# Patient Record
Sex: MALE | Race: BLACK OR AFRICAN AMERICAN | URBAN - METROPOLITAN AREA
[De-identification: names, ages, dates, MRNs, and addresses within clinical notes are randomized per-mention and may not be internally consistent; named-entity substitution may affect disease eponyms.]

---

## 2017-05-22 ENCOUNTER — OUTPATIENT (OUTPATIENT)
Dept: OUTPATIENT SERVICES | Facility: HOSPITAL | Age: 47
LOS: 1 days | Discharge: HOME | End: 2017-05-22

## 2017-06-28 DIAGNOSIS — K02.53 DENTAL CARIES ON PIT AND FISSURE SURFACE PENETRATING INTO PULP: ICD-10-CM

## 2023-05-12 ENCOUNTER — OUTPATIENT (OUTPATIENT)
Dept: OUTPATIENT SERVICES | Facility: HOSPITAL | Age: 53
LOS: 1 days | End: 2023-05-12
Payer: SELF-PAY

## 2023-05-12 DIAGNOSIS — K02.63 DENTAL CARIES ON SMOOTH SURFACE PENETRATING INTO PULP: ICD-10-CM

## 2023-05-12 DIAGNOSIS — K02.9 DENTAL CARIES, UNSPECIFIED: ICD-10-CM

## 2023-05-12 PROCEDURE — D0330: CPT

## 2023-05-12 PROCEDURE — D0220: CPT

## 2023-05-12 PROCEDURE — D0140: CPT

## 2023-05-12 PROCEDURE — D7140: CPT

## 2024-02-13 ENCOUNTER — APPOINTMENT (OUTPATIENT)
Dept: NEUROLOGY | Facility: CLINIC | Age: 54
End: 2024-02-13

## 2024-02-13 PROBLEM — Z00.00 ENCOUNTER FOR PREVENTIVE HEALTH EXAMINATION: Status: ACTIVE | Noted: 2024-02-13

## 2024-02-22 ENCOUNTER — APPOINTMENT (OUTPATIENT)
Dept: NEUROLOGY | Facility: CLINIC | Age: 54
End: 2024-02-22
Payer: MEDICAID

## 2024-02-22 VITALS — HEIGHT: 70 IN | BODY MASS INDEX: 30.06 KG/M2 | WEIGHT: 210 LBS

## 2024-02-22 DIAGNOSIS — Z78.9 OTHER SPECIFIED HEALTH STATUS: ICD-10-CM

## 2024-02-22 DIAGNOSIS — Z86.59 PERSONAL HISTORY OF OTHER MENTAL AND BEHAVIORAL DISORDERS: ICD-10-CM

## 2024-02-22 DIAGNOSIS — G56.21 LESION OF ULNAR NERVE, RIGHT UPPER LIMB: ICD-10-CM

## 2024-02-22 DIAGNOSIS — G56.22 LESION OF ULNAR NERVE, LEFT UPPER LIMB: ICD-10-CM

## 2024-02-22 PROCEDURE — 99204 OFFICE O/P NEW MOD 45 MIN: CPT

## 2024-02-22 NOTE — ASSESSMENT
[FreeTextEntry1] : Cervical radiculopathy/bilateral carpal tunnel syndrome/bilateral cubital tunnel syndrome - MRI of the cervical spine without gadolinium - EMG/NCS of the upper extremities - A trial of physical therapy - Trial of gabapentin 300 mg 3 times daily.  Patient warned of the potential side effect of medication and he reported understanding   I, Lindsey Mathew, Attest that this documentation has been prepared under the direction and in the presence of Provider Sj Houston DO  Thank You for letting me assist in the management of this patient.   Sj Houston DO Board Certified, Neurology

## 2024-02-22 NOTE — PHYSICAL EXAM
[Person] : oriented to person [Place] : oriented to place [Time] : oriented to time [Visual Intact] : visual attention was ~T not ~L decreased [Concentration Intact] : normal concentrating ability [Naming Objects] : no difficulty naming common objects [Repeating Phrases] : no difficulty repeating a phrase [Writing A Sentence] : no difficulty writing a sentence [Fluency] : fluency intact [Comprehension] : comprehension intact [Reading] : reading intact [Past History] : adequate knowledge of personal past history [Cranial Nerves Optic (II)] : visual acuity intact bilaterally,  visual fields full to confrontation, pupils equal round and reactive to light [Cranial Nerves Oculomotor (III)] : extraocular motion intact [Cranial Nerves Trigeminal (V)] : facial sensation intact symmetrically [Cranial Nerves Facial (VII)] : face symmetrical [Cranial Nerves Vestibulocochlear (VIII)] : hearing was intact bilaterally [Cranial Nerves Glossopharyngeal (IX)] : tongue and palate midline [Cranial Nerves Accessory (XI - Cranial And Spinal)] : head turning and shoulder shrug symmetric [Cranial Nerves Hypoglossal (XII)] : there was no tongue deviation with protrusion [Motor Tone] : muscle tone was normal in all four extremities [Motor Strength] : muscle strength was normal in all four extremities [No Muscle Atrophy] : normal bulk in all four extremities [Abnormal Walk] : normal gait [Balance] : balance was intact [2+] : Ankle jerk left 2+ [Past-pointing] : there was no past-pointing [Tremor] : no tremor present [Plantar Reflex Right Only] : normal on the right [Plantar Reflex Left Only] : normal on the left [FreeTextEntry6] : Mild right  weakness 5 -/5 [FreeTextEntry7] : Positive Tinel's sign at bilateral wrist and elbows

## 2024-02-22 NOTE — HISTORY OF PRESENT ILLNESS
[FreeTextEntry1] : It is a pleasure to see MR. DELGADO In the office today. He is A 54-Year-Old Man who presents to the office today for the evaluation of Bilateral hand numbness that gradually progressed for one month. He has weakness on the right hand that radiates to both the fingertips to the elbow causing a numbing, tingling sensation. is left hand has mild tingling that does not radiate. He would sometimes get neck pain. He works on a [employer] and is currently on a leave of absence.

## 2024-02-23 ENCOUNTER — TRANSCRIPTION ENCOUNTER (OUTPATIENT)
Age: 54
End: 2024-02-23

## 2024-03-04 ENCOUNTER — APPOINTMENT (OUTPATIENT)
Dept: NEUROLOGY | Facility: CLINIC | Age: 54
End: 2024-03-04
Payer: MEDICAID

## 2024-03-04 PROCEDURE — 95912 NRV CNDJ TEST 11-12 STUDIES: CPT

## 2024-03-04 PROCEDURE — 95886 MUSC TEST DONE W/N TEST COMP: CPT

## 2024-03-12 ENCOUNTER — APPOINTMENT (OUTPATIENT)
Dept: MRI IMAGING | Facility: CLINIC | Age: 54
End: 2024-03-12

## 2024-03-25 ENCOUNTER — APPOINTMENT (OUTPATIENT)
Dept: NEUROLOGY | Facility: CLINIC | Age: 54
End: 2024-03-25

## 2024-04-09 ENCOUNTER — APPOINTMENT (OUTPATIENT)
Dept: NEUROLOGY | Facility: CLINIC | Age: 54
End: 2024-04-09
Payer: MEDICAID

## 2024-04-09 VITALS
HEART RATE: 60 BPM | DIASTOLIC BLOOD PRESSURE: 77 MMHG | SYSTOLIC BLOOD PRESSURE: 139 MMHG | HEIGHT: 70 IN | WEIGHT: 210 LBS | BODY MASS INDEX: 30.06 KG/M2

## 2024-04-09 PROCEDURE — 99214 OFFICE O/P EST MOD 30 MIN: CPT

## 2024-04-09 RX ORDER — GABAPENTIN 300 MG/1
300 CAPSULE ORAL 3 TIMES DAILY
Qty: 90 | Refills: 0 | Status: ACTIVE | COMMUNITY
Start: 2024-02-22 | End: 1900-01-01

## 2024-04-09 NOTE — ASSESSMENT
[FreeTextEntry1] : 54 year old male with cervical radiculopathy and right carpal tunnel syndrome.  Patient has been participating in physical therapy for his neck for the last 4-6 weeks and today reports no significant improvement therefore I have ordered an MRI of the cervical spine for further evaluation.  Regarding his carpal tunnel syndrome, today we reviewed his EMG testing which reflected his diagnosis.  Patient will begin occupational therapy for his bilateral wrists as well as a trial of Gabapentin 300mg once daily at night. Patient is currently out of work pending further testing and re-evaluation. Paper work for disability was completed today. He will return to the office for follow up in 6 weeks.   *All potential risks, benefits, side effects and interactions of any medications prescribed where discussed in detail with patient at the time of todays encounter.*  Supervising Physician : Sj Houston, DO

## 2024-04-09 NOTE — PHYSICAL EXAM
[Person] : oriented to person [Place] : oriented to place [Time] : oriented to time [Concentration Intact] : normal concentrating ability [Visual Intact] : visual attention was ~T not ~L decreased [Naming Objects] : no difficulty naming common objects [Repeating Phrases] : no difficulty repeating a phrase [Writing A Sentence] : no difficulty writing a sentence [Fluency] : fluency intact [Comprehension] : comprehension intact [Reading] : reading intact [Past History] : adequate knowledge of personal past history [Cranial Nerves Optic (II)] : visual acuity intact bilaterally,  visual fields full to confrontation, pupils equal round and reactive to light [Cranial Nerves Oculomotor (III)] : extraocular motion intact [Cranial Nerves Trigeminal (V)] : facial sensation intact symmetrically [Cranial Nerves Facial (VII)] : face symmetrical [Cranial Nerves Vestibulocochlear (VIII)] : hearing was intact bilaterally [Cranial Nerves Glossopharyngeal (IX)] : tongue and palate midline [Cranial Nerves Accessory (XI - Cranial And Spinal)] : head turning and shoulder shrug symmetric [Cranial Nerves Hypoglossal (XII)] : there was no tongue deviation with protrusion [Motor Tone] : muscle tone was normal in all four extremities [Motor Strength] : muscle strength was normal in all four extremities [No Muscle Atrophy] : normal bulk in all four extremities [Abnormal Walk] : normal gait [Balance] : balance was intact [2+] : Ankle jerk left 2+ [Motor Strength Upper Extremities Right] : there was weakness of the right upper extremity [PERRL With Normal Accommodation] : pupils were equal in size, round, reactive to light, with normal accommodation [Extraocular Movements] : extraocular movements were intact [Hearing Threshold Finger Rub Not [place]] : hearing was normal [Involuntary Movements] : no involuntary movements were seen [Past-pointing] : there was no past-pointing [Tremor] : no tremor present [Plantar Reflex Right Only] : normal on the right [Plantar Reflex Left Only] : normal on the left [FreeTextEntry6] : Mild right  weakness 5 -/5 [FreeTextEntry7] : Positive Tinel's sign at bilateral wrist and elbows [FreeTextEntry1] : midly limited ROM

## 2024-04-09 NOTE — HISTORY OF PRESENT ILLNESS
[FreeTextEntry1] : Original Presentation : It is a pleasure to see MR. STARK In the office today. He is A 54-Year-Old Man who presents to the office today for the evaluation of Bilateral hand numbness that gradually progressed for one month. He has weakness on the right hand that radiates to both the fingertips to the elbow causing a numbing, tingling sensation. is left hand has mild tingling that does not radiate. He would sometimes get neck pain. 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   Diagnostic Testing :  EMG Upper Extremities : Mild right Carpal tunnel syndrome   Today : Today I had the pleasure of seeing Mr. Stark   in our office for follow up.  Their previous history and physical findings have been reviewed.   Patient  remains under our neurologic care for Right Carpal tunnel syndrome and cervical radiculopathy. Patient has been participating in physical therapy for his neck for the last 4-6 weeks and today reports no significant improvement and endorses recurrent 7/10 primarily right sided neck pain that radiates down his right arm to his hand. Today we discussed getting an MRI of the cervical spine for further evaluation.  Regarding his carpal tunnel syndrome, patient reports constant numbness and tingling to his right wrist and fingers as well as hand  weakness. He was unable to begin OT for his hands or the trial of Gabapentin discussed with him by Dr. Houston at his prior visit, but is willing to begin today.

## 2024-04-09 NOTE — REVIEW OF SYSTEMS
[Negative] : Heme/Lymph [Hand Weakness] :  hand weakness [Numbness] : numbness [Tingling] : tingling

## 2024-04-09 NOTE — REASON FOR VISIT
[Consultation] : a consultation visit [Follow-Up: _____] : a [unfilled] follow-up visit [FreeTextEntry1] : Bilateral Hand numbness.

## 2024-04-10 ENCOUNTER — APPOINTMENT (OUTPATIENT)
Dept: NEUROLOGY | Facility: CLINIC | Age: 54
End: 2024-04-10

## 2024-05-28 ENCOUNTER — APPOINTMENT (OUTPATIENT)
Dept: NEUROLOGY | Facility: CLINIC | Age: 54
End: 2024-05-28
Payer: MEDICAID

## 2024-05-28 VITALS — BODY MASS INDEX: 30.06 KG/M2 | WEIGHT: 210 LBS | HEIGHT: 70 IN

## 2024-05-28 VITALS — DIASTOLIC BLOOD PRESSURE: 71 MMHG | SYSTOLIC BLOOD PRESSURE: 111 MMHG | HEART RATE: 71 BPM

## 2024-05-28 PROCEDURE — 99214 OFFICE O/P EST MOD 30 MIN: CPT

## 2024-05-28 NOTE — HISTORY OF PRESENT ILLNESS
[FreeTextEntry1] : Mr. CHARANJIT DELGADO returns to the office for follow-up and his prior history and physical have been reviewed and he reports no change since last visit.  he has been seeing us for symptoms suggestive of bilateral carpal tunnel syndrome as well as cervical radiculopathy.  He has had MRI of the cervical spine which showed multilevel degenerative changes most prominent at the level of C4-5 and C5-6 which caused mild stenosis as well as moderate to severe foraminal stenosis bilaterally.  EMG nerve conduction study of the upper extremities only showed mild right carpal tunnel syndrome.  Patient has done physical therapy but has not gotten better in terms of the pain shooting down his right arm, plus the weakness that he feels in the right arm has not improved.  He started doing occupational therapy but only for 2 sessions.  He denies any urinary/bowel incontinence

## 2024-05-28 NOTE — ASSESSMENT
[FreeTextEntry1] : Cervical radiculopathy-with associated weakness without improvement with physical therapy - Neurosurgical evaluation - Pain management for pain control  Right carpal tunnel syndrome - Mild, not likely to be the cause of his main complaint. - Can continue with occupational therapy  Disability paperwork filled out, not recommended to go back to work until next visit

## 2024-05-28 NOTE — PHYSICAL EXAM
[Person] : oriented to person [Place] : oriented to place [Time] : oriented to time [Concentration Intact] : normal concentrating ability [Visual Intact] : visual attention was ~T not ~L decreased [Naming Objects] : no difficulty naming common objects [Repeating Phrases] : no difficulty repeating a phrase [Writing A Sentence] : no difficulty writing a sentence [Fluency] : fluency intact [Comprehension] : comprehension intact [Reading] : reading intact [Past History] : adequate knowledge of personal past history [Cranial Nerves Optic (II)] : visual acuity intact bilaterally,  visual fields full to confrontation, pupils equal round and reactive to light [Cranial Nerves Oculomotor (III)] : extraocular motion intact [Cranial Nerves Trigeminal (V)] : facial sensation intact symmetrically [Cranial Nerves Facial (VII)] : face symmetrical [Cranial Nerves Vestibulocochlear (VIII)] : hearing was intact bilaterally [Cranial Nerves Glossopharyngeal (IX)] : tongue and palate midline [Cranial Nerves Accessory (XI - Cranial And Spinal)] : head turning and shoulder shrug symmetric [Cranial Nerves Hypoglossal (XII)] : there was no tongue deviation with protrusion [Motor Tone] : muscle tone was normal in all four extremities [Motor Strength] : muscle strength was normal in all four extremities [No Muscle Atrophy] : normal bulk in all four extremities [Abnormal Walk] : normal gait [Balance] : balance was intact [Past-pointing] : there was no past-pointing [Tremor] : no tremor present [2+] : Ankle jerk left 2+ [Plantar Reflex Right Only] : normal on the right [Plantar Reflex Left Only] : normal on the left [FreeTextEntry6] : Mild right  weakness 5 -/5 [FreeTextEntry7] : Positive Tinel's sign at bilateral wrist and elbows

## 2024-06-07 ENCOUNTER — NON-APPOINTMENT (OUTPATIENT)
Age: 54
End: 2024-06-07

## 2024-06-18 ENCOUNTER — APPOINTMENT (OUTPATIENT)
Dept: NEUROLOGY | Facility: CLINIC | Age: 54
End: 2024-06-18
Payer: MEDICAID

## 2024-06-18 VITALS — HEIGHT: 70 IN | BODY MASS INDEX: 30.06 KG/M2 | WEIGHT: 210 LBS

## 2024-06-18 DIAGNOSIS — T88.7XXA UNSPECIFIED ADVERSE EFFECT OF DRUG OR MEDICAMENT, INITIAL ENCOUNTER: ICD-10-CM

## 2024-06-18 DIAGNOSIS — G56.21 LESION OF ULNAR NERVE, RIGHT UPPER LIMB: ICD-10-CM

## 2024-06-18 PROCEDURE — 99214 OFFICE O/P EST MOD 30 MIN: CPT

## 2024-06-18 NOTE — HISTORY OF PRESENT ILLNESS
[FreeTextEntry1] : Mr. CHARANJIT DELGADO returns to the office for follow-up and his prior history and physical have been reviewed and he reports no change since last visit.  he has been seeing us for  cervical radiculopathy, right carpal tunnel syndrome.  He has had MRI of the cervical spine, EMG/NCS of the upper extremities done and has started on physical therapy as well as Occupational Therapy.  He has only done a few sessions without any improvement.  He still complains of baseline pain of 7-8/10 with limited range of motion in the neck and shooting pain both down the neck as well as up the arm from his wrist.    When he was seen last time less than 3 weeks ago, he was recommended to see pain management and neurosurgery for additional treatment and intervention which he has not done.  He has been on short-term disability which is expiring and he would like to apply for long-term disability  He takes gabapentin 300 mg twice a day but he does complain that it makes him drowsy even at this dosage.

## 2024-06-18 NOTE — ASSESSMENT
[FreeTextEntry1] : Cervical radiculopathy/right carpal tunnel syndrome - Workup completed - Continue with gabapentin - Pain management as ordered - Continue with neurosurgery as ordered - I told the patient that I cannot certify his long-term disability based on few sessions of physical therapy and Occupational Therapy.  He should see the pain specialist as well as neurosurgery, and he is agreeable with the plan

## 2024-06-24 ENCOUNTER — APPOINTMENT (OUTPATIENT)
Dept: PAIN MANAGEMENT | Facility: CLINIC | Age: 54
End: 2024-06-24
Payer: MEDICAID

## 2024-06-24 DIAGNOSIS — G56.03 CARPAL TUNNEL SYNDROM,BILATERAL UPPER LIMBS: ICD-10-CM

## 2024-06-24 DIAGNOSIS — M54.12 RADICULOPATHY, CERVICAL REGION: ICD-10-CM

## 2024-06-24 PROCEDURE — 99204 OFFICE O/P NEW MOD 45 MIN: CPT

## 2024-06-24 RX ORDER — MELOXICAM 15 MG/1
15 TABLET ORAL DAILY
Qty: 30 | Refills: 0 | Status: ACTIVE | COMMUNITY
Start: 2024-06-24 | End: 1900-01-01

## 2024-07-10 ENCOUNTER — APPOINTMENT (OUTPATIENT)
Dept: PAIN MANAGEMENT | Facility: CLINIC | Age: 54
End: 2024-07-10

## 2024-07-26 ENCOUNTER — APPOINTMENT (OUTPATIENT)
Dept: PAIN MANAGEMENT | Facility: CLINIC | Age: 54
End: 2024-07-26

## 2024-07-30 ENCOUNTER — APPOINTMENT (OUTPATIENT)
Dept: NEUROSURGERY | Facility: CLINIC | Age: 54
End: 2024-07-30
Payer: MEDICAID

## 2024-07-30 VITALS — BODY MASS INDEX: 30.06 KG/M2 | HEIGHT: 70 IN | WEIGHT: 210 LBS

## 2024-07-30 DIAGNOSIS — Z78.9 OTHER SPECIFIED HEALTH STATUS: ICD-10-CM

## 2024-07-30 DIAGNOSIS — F17.200 NICOTINE DEPENDENCE, UNSPECIFIED, UNCOMPLICATED: ICD-10-CM

## 2024-07-30 PROCEDURE — 99203 OFFICE O/P NEW LOW 30 MIN: CPT

## 2024-08-01 NOTE — ASSESSMENT
[FreeTextEntry1] : C4-5, C5-6 TDR candidate  Patient will return to the office should he wish to schedule

## 2024-08-01 NOTE — HISTORY OF PRESENT ILLNESS
[de-identified] : Mr Stark presents for evaluation of his cervical spondylosis with radiculopathy as seen on xray/mri of the cervical spine. His symptoms are bilateral and predominantly in C5/6 distribution. He has failed physical therapy and pain management to date. MRI demonstrates severe spondylotic changes at C4-5, C5-6. Given these findings, he is a candidate for C4-5, C5-6 TDR vs ACDF; he will return to our office when he is ready.
[de-identified] : Mr Stark presents for evaluation of his cervical spondylosis with radiculopathy as seen on xray/mri of the cervical spine. His symptoms are bilateral and predominantly in C5/6 distribution. He has failed physical therapy and pain management to date. MRI demonstrates severe spondylotic changes at C4-5, C5-6. Given these findings, he is a candidate for C4-5, C5-6 TDR vs ACDF; he will return to our office when he is ready.
No

## 2024-08-01 NOTE — PHYSICAL EXAM
[General Appearance - Alert] : alert [Oriented To Time, Place, And Person] : oriented to person, place, and time [Motor Tone] : muscle tone was normal in all four extremities [Motor Strength] : muscle strength was normal in all four extremities [Motor Handedness Right-Handed] : the patient is right hand dominant [Sensation Tactile Decrease] : light touch was intact [Sensation Pain / Temperature Decrease] : pain and temperature was intact [Abnormal Walk] : normal gait

## 2024-08-06 ENCOUNTER — APPOINTMENT (OUTPATIENT)
Dept: NEUROLOGY | Facility: CLINIC | Age: 54
End: 2024-08-06

## 2024-08-06 PROCEDURE — 99213 OFFICE O/P EST LOW 20 MIN: CPT

## 2024-08-06 NOTE — PHYSICAL EXAM

## 2024-08-06 NOTE — HISTORY OF PRESENT ILLNESS
[FreeTextEntry1] : Mr. CHARANJIT DELGADO returns to the office for follow-up and his prior history and physical have been reviewed and he reports no change since last visit.  he has been seeing us for  cervical radiculopathy, right carpal tunnel syndrome.  He has had MRI of the cervical spine, EMG/NCS of the upper extremities done and has started on physical therapy as well as Occupational Therapy.  He has only done a few sessions without any improvement.  He still complains of baseline pain of 7-8/10 with limited range of motion in the neck and shooting pain both down the neck as well as up the arm from his wrist.  When he was seen last time less than 3 weeks ago, he was recommended to see pain management and neurosurgery for additional treatment and intervention which he has not done.  He has been on short-term disability which is expiring and he would like to apply for long-term disability. He takes gabapentin 300 mg twice a day but he does complain that it makes him drowsy even at this dosage.  Today : Today I had the pleasure of seeing Mr. DELGADO in our office for follow up.  Their past medical history and imaging have been reviewed.    Patient  remains under our neurologic care for cervical radiculopathy and  right carpal tunnel syndrome.  He has had MRI of the cervical spine, EMG/NCS of the upper extremities done and has started on physical therapy as well as Occupational Therapy and did not see significant improvement. Since his last visit he has seen pain management who offered a NEREIDA C7-T1 NO MAC and was prescribed Meloxicam 15mg x 4 weeks. On 7.30.24 he saw Neurosurgeon Dr. Benitez who felt he may be a candidate for a C4-5, C5-6 TDR vs ACDF and was advised to follow up if he wishes to proceed. Today patients primary concern is that his short term disability has run out and he was told if he goes on long term disability he will be terminated. Patient is still unsure if he wishes to proceed with intervention from pain management of neurosurgery. He has done OT for his CTS in addition to Gabapentin 300mg TID and states he has not experienced significant  improvement in the burning pain" to his right forearm which starts at the finger tips and travels up to his elbow and stops. Gabapentin  provides him with mild improvement  in the severity of his pain but does not completely relieve his symptoms. We discussed seeing Ortho / Hand specialist to discuss further intervention as well as night time wrist splinting for his right wrist. In the mean time he states he will keep us updated on his work status as he may choose to return to work with activity restrictions but first needs to speak wit his employer.

## 2024-08-06 NOTE — ASSESSMENT
[FreeTextEntry1] : 54 year old male with cervical radiculopathy and  right carpal tunnel syndrome. Regarding his cervical radiculopathy he will follow up with pain management and Neurosurgery both of whom offered intervention. For his right CTS he will continue Gabapentin 300mg TID as is and was provided referral to be seen by an Orthopedic Hand specialist. He will keep us updated on his decision to return to work light duty with activity restrictions vs. [employer]. TTM to be scheduled for 3 weeks.   Supervising Physician : Sj Houston, DO

## 2024-08-13 ENCOUNTER — NON-APPOINTMENT (OUTPATIENT)
Age: 54
End: 2024-08-13

## 2024-08-14 ENCOUNTER — NON-APPOINTMENT (OUTPATIENT)
Age: 54
End: 2024-08-14

## 2024-08-21 ENCOUNTER — APPOINTMENT (OUTPATIENT)
Dept: NEUROSURGERY | Facility: CLINIC | Age: 54
End: 2024-08-21

## 2024-09-10 ENCOUNTER — APPOINTMENT (OUTPATIENT)
Dept: NEUROLOGY | Facility: CLINIC | Age: 54
End: 2024-09-10

## 2024-09-11 ENCOUNTER — APPOINTMENT (OUTPATIENT)
Dept: ORTHOPEDIC SURGERY | Facility: CLINIC | Age: 54
End: 2024-09-11

## 2024-09-17 ENCOUNTER — APPOINTMENT (OUTPATIENT)
Dept: ORTHOPEDIC SURGERY | Facility: CLINIC | Age: 54
End: 2024-09-17

## 2024-09-17 DIAGNOSIS — M54.12 RADICULOPATHY, CERVICAL REGION: ICD-10-CM

## 2024-11-01 ENCOUNTER — APPOINTMENT (OUTPATIENT)
Dept: NEUROLOGY | Facility: CLINIC | Age: 54
End: 2024-11-01
Payer: MEDICAID

## 2024-11-01 VITALS
WEIGHT: 210 LBS | HEIGHT: 70 IN | HEART RATE: 61 BPM | DIASTOLIC BLOOD PRESSURE: 82 MMHG | BODY MASS INDEX: 30.06 KG/M2 | SYSTOLIC BLOOD PRESSURE: 130 MMHG

## 2024-11-01 DIAGNOSIS — G56.21 LESION OF ULNAR NERVE, RIGHT UPPER LIMB: ICD-10-CM

## 2024-11-01 DIAGNOSIS — M54.12 RADICULOPATHY, CERVICAL REGION: ICD-10-CM

## 2024-11-01 PROCEDURE — 99214 OFFICE O/P EST MOD 30 MIN: CPT

## 2025-01-29 ENCOUNTER — APPOINTMENT (OUTPATIENT)
Dept: NEUROLOGY | Facility: CLINIC | Age: 55
End: 2025-01-29

## 2025-02-12 ENCOUNTER — APPOINTMENT (OUTPATIENT)
Dept: NEUROLOGY | Facility: CLINIC | Age: 55
End: 2025-02-12
Payer: COMMERCIAL

## 2025-02-12 VITALS — HEART RATE: 54 BPM | SYSTOLIC BLOOD PRESSURE: 147 MMHG | DIASTOLIC BLOOD PRESSURE: 84 MMHG

## 2025-02-12 VITALS — WEIGHT: 210 LBS | BODY MASS INDEX: 30.06 KG/M2 | HEIGHT: 70 IN

## 2025-02-12 DIAGNOSIS — G56.03 CARPAL TUNNEL SYNDROM,BILATERAL UPPER LIMBS: ICD-10-CM

## 2025-02-12 PROCEDURE — 99214 OFFICE O/P EST MOD 30 MIN: CPT

## 2025-02-20 ENCOUNTER — APPOINTMENT (OUTPATIENT)
Dept: PAIN MANAGEMENT | Facility: CLINIC | Age: 55
End: 2025-02-20
Payer: COMMERCIAL

## 2025-02-20 VITALS — BODY MASS INDEX: 29.35 KG/M2 | WEIGHT: 205 LBS | HEIGHT: 70 IN

## 2025-02-20 DIAGNOSIS — M54.50 LOW BACK PAIN, UNSPECIFIED: ICD-10-CM

## 2025-02-20 DIAGNOSIS — M54.16 RADICULOPATHY, LUMBAR REGION: ICD-10-CM

## 2025-02-20 DIAGNOSIS — M54.12 RADICULOPATHY, CERVICAL REGION: ICD-10-CM

## 2025-02-20 PROCEDURE — 99214 OFFICE O/P EST MOD 30 MIN: CPT

## 2025-02-20 RX ORDER — NAPROXEN 500 MG/1
500 TABLET ORAL TWICE DAILY
Qty: 30 | Refills: 0 | Status: ACTIVE | COMMUNITY
Start: 2025-02-20 | End: 1900-01-01

## 2025-03-21 ENCOUNTER — APPOINTMENT (OUTPATIENT)
Dept: PAIN MANAGEMENT | Facility: CLINIC | Age: 55
End: 2025-03-21
Payer: COMMERCIAL

## 2025-03-21 DIAGNOSIS — M54.16 RADICULOPATHY, LUMBAR REGION: ICD-10-CM

## 2025-03-21 DIAGNOSIS — M54.50 LOW BACK PAIN, UNSPECIFIED: ICD-10-CM

## 2025-03-21 PROCEDURE — 99214 OFFICE O/P EST MOD 30 MIN: CPT

## 2025-03-21 RX ORDER — METHYLPREDNISOLONE 4 MG/1
4 TABLET ORAL
Qty: 1 | Refills: 0 | Status: ACTIVE | COMMUNITY
Start: 2025-03-21 | End: 1900-01-01

## 2025-04-25 ENCOUNTER — APPOINTMENT (OUTPATIENT)
Dept: PAIN MANAGEMENT | Facility: CLINIC | Age: 55
End: 2025-04-25

## 2025-05-05 ENCOUNTER — APPOINTMENT (OUTPATIENT)
Dept: PAIN MANAGEMENT | Facility: CLINIC | Age: 55
End: 2025-05-05
Payer: COMMERCIAL

## 2025-05-05 DIAGNOSIS — M54.50 LOW BACK PAIN, UNSPECIFIED: ICD-10-CM

## 2025-05-05 DIAGNOSIS — M54.12 RADICULOPATHY, CERVICAL REGION: ICD-10-CM

## 2025-05-05 PROCEDURE — 99214 OFFICE O/P EST MOD 30 MIN: CPT

## 2025-05-05 RX ORDER — DICLOFENAC SODIUM 75 MG/1
75 TABLET, DELAYED RELEASE ORAL
Qty: 60 | Refills: 0 | Status: ACTIVE | COMMUNITY
Start: 2025-05-05 | End: 1900-01-01

## 2025-05-07 ENCOUNTER — APPOINTMENT (OUTPATIENT)
Dept: ORTHOPEDIC SURGERY | Facility: CLINIC | Age: 55
End: 2025-05-07

## 2025-06-03 ENCOUNTER — APPOINTMENT (OUTPATIENT)
Dept: ORTHOPEDIC SURGERY | Facility: CLINIC | Age: 55
End: 2025-06-03

## 2025-06-09 ENCOUNTER — APPOINTMENT (OUTPATIENT)
Dept: PAIN MANAGEMENT | Facility: CLINIC | Age: 55
End: 2025-06-09
Payer: COMMERCIAL

## 2025-06-09 PROCEDURE — 99214 OFFICE O/P EST MOD 30 MIN: CPT

## 2025-06-09 RX ORDER — DIAZEPAM 5 MG/1
5 TABLET ORAL
Qty: 1 | Refills: 0 | Status: ACTIVE | COMMUNITY
Start: 2025-06-09 | End: 1900-01-01

## 2025-06-20 ENCOUNTER — APPOINTMENT (OUTPATIENT)
Dept: PAIN MANAGEMENT | Facility: CLINIC | Age: 55
End: 2025-06-20

## 2025-06-24 ENCOUNTER — APPOINTMENT (OUTPATIENT)
Dept: PAIN MANAGEMENT | Facility: CLINIC | Age: 55
End: 2025-06-24
Payer: COMMERCIAL

## 2025-06-24 PROCEDURE — 99214 OFFICE O/P EST MOD 30 MIN: CPT

## 2025-06-26 ENCOUNTER — APPOINTMENT (OUTPATIENT)
Dept: PAIN MANAGEMENT | Facility: CLINIC | Age: 55
End: 2025-06-26
Payer: COMMERCIAL

## 2025-06-26 PROCEDURE — 62323 NJX INTERLAMINAR LMBR/SAC: CPT

## 2025-07-14 ENCOUNTER — APPOINTMENT (OUTPATIENT)
Dept: PAIN MANAGEMENT | Facility: CLINIC | Age: 55
End: 2025-07-14
Payer: COMMERCIAL

## 2025-07-14 PROBLEM — M54.16 LUMBAR RADICULOPATHY, ACUTE: Status: ACTIVE | Noted: 2025-06-26

## 2025-07-14 PROBLEM — M48.061 LUMBAR SPINAL STENOSIS: Status: ACTIVE | Noted: 2025-06-24

## 2025-07-14 PROCEDURE — 99213 OFFICE O/P EST LOW 20 MIN: CPT
